# Patient Record
Sex: FEMALE | Race: OTHER | Employment: FULL TIME | URBAN - METROPOLITAN AREA
[De-identification: names, ages, dates, MRNs, and addresses within clinical notes are randomized per-mention and may not be internally consistent; named-entity substitution may affect disease eponyms.]

---

## 2022-02-23 ENCOUNTER — OFFICE VISIT (OUTPATIENT)
Dept: ORTHOPEDIC SURGERY | Age: 62
End: 2022-02-23
Payer: COMMERCIAL

## 2022-02-23 VITALS — BODY MASS INDEX: 29.88 KG/M2 | HEIGHT: 64 IN | WEIGHT: 175 LBS

## 2022-02-23 DIAGNOSIS — M76.62 ACHILLES TENDINITIS OF LEFT LOWER EXTREMITY: ICD-10-CM

## 2022-02-23 DIAGNOSIS — M79.672 PAIN OF LEFT HEEL: ICD-10-CM

## 2022-02-23 DIAGNOSIS — M72.2 PLANTAR FASCIITIS OF LEFT FOOT: Primary | ICD-10-CM

## 2022-02-23 PROCEDURE — 99203 OFFICE O/P NEW LOW 30 MIN: CPT | Performed by: ORTHOPAEDIC SURGERY

## 2022-02-23 PROCEDURE — L4387 NON-PNEUM WALK BOOT PRE OTS: HCPCS | Performed by: ORTHOPAEDIC SURGERY

## 2022-02-23 RX ORDER — METHYLPREDNISOLONE 4 MG/1
TABLET ORAL
Qty: 1 DOSE PACK | Refills: 0 | Status: SHIPPED | OUTPATIENT
Start: 2022-02-23 | End: 2022-03-07

## 2022-02-23 NOTE — Clinical Note
2/23/2022    Patient: Radha Mahmood   YOB: 1960   Date of Visit: 2/23/2022     Dusty Dubon MD  [address] [place] [id number]  Via     Dear Dusty Dubon MD,      Thank you for referring Ms. Radha Mahmood to [facility] for evaluation. My notes for this consultation are attached. If you have questions, please do not hesitate to call me. I look forward to following your patient along with you.       Sincerely,    Latrell Mcneal MD

## 2022-02-23 NOTE — PROGRESS NOTES
Dusty Crowley (: 1960) is a 64 y.o. female, patient,here for evaluation of the following   Chief Complaint   Patient presents with    Foot Injury     she fell on [place] day and when she fell she also damaged ribs and so it was hard to breath and so she was unable to walk or stand and so it was hard to realize that her foot was injured she has pain at the heel up into the foot pad and also at the achilles tendon of the foot         ASSESSMENT/PLAN:  Below is the assessment and plan developed based on review of pertinent history, physical exam, labs, studies, and medications. 1. Plantar fasciitis of left foot  -     REFERRAL TO DME  -     DE NON-PNEUM WALK BOOT PRE OTS  -     methylPREDNISolone (MEDROL DOSEPACK) 4 mg tablet; Take 1 Tablet by mouth Specific Days and Specific Times for 6 days, THEN 1 Tablet Specific Days and Specific Times for 6 days. As instructed on packet, Normal, Disp-1 Dose Pack, R-0  2. Achilles tendinitis of left lower extremity  -     REFERRAL TO DME  -     DE NON-PNEUM WALK BOOT PRE OTS  -     methylPREDNISolone (MEDROL DOSEPACK) 4 mg tablet; Take 1 Tablet by mouth Specific Days and Specific Times for 6 days, THEN 1 Tablet Specific Days and Specific Times for 6 days. As instructed on packet, Normal, Disp-1 Dose Pack, R-0  3. Pain of left heel  -     XR STANDING FOOT LT MIN 3 V; Future  -     REFERRAL TO DME  -     DE NON-PNEUM WALK BOOT PRE OTS  -     methylPREDNISolone (MEDROL DOSEPACK) 4 mg tablet; Take 1 Tablet by mouth Specific Days and Specific Times for 6 days, THEN 1 Tablet Specific Days and Specific Times for 6 days. As instructed on packet, Normal, Disp-1 Dose Pack, R-0      Patient informed of treatment for this condition of plantar fasciitis and Achilles tendinitis, they can be correlated due to the tightness of the lower extremity tendons muscles.   Recommend stretching program as demonstrated today in clinic and provided handout of information about the condition both plantar fasciitis and Achilles tendinitis. Recommended Medrol pack to use and best prescribed sent to pharmacy. Finally provided a tall boot because she is having trouble walking. I recommended obtaining appropriate shoe wear and insoles which she will transition to after she gets out of the boot. Physical therapy will be an option after she gets out of boot. She can return in 6 weeks, at which point we will likely refer to physical therapy if she is feeling much better after the treatment implemented. Approximately 30 minutes was spent with this patient explaining the condition, demonstrated the correct stretching program to do, answering all her questions about treatment plan and options for this chronic problem. There was also concern about a plantar fascial tear, inform if there is a tear it will eventually get better but it can take a long time to get better. Surgery is not indicated. Return in about 6 weeks (around 4/6/2022). Allergies   Allergen Reactions    Nsaids (Non-Steroidal Anti-Inflammatory Drug) Vertigo       Current Outpatient Medications   Medication Sig    methylPREDNISolone (MEDROL DOSEPACK) 4 mg tablet Take 1 Tablet by mouth Specific Days and Specific Times for 6 days, THEN 1 Tablet Specific Days and Specific Times for 6 days. As instructed on packet     No current facility-administered medications for this visit. No past medical history on file. No past surgical history on file. No family history on file.     Social History     Socioeconomic History    Marital status: UNKNOWN     Spouse name: Not on file    Number of children: Not on file    Years of education: Not on file    Highest education level: Not on file   Occupational History    Not on file   Tobacco Use    Smoking status: Never Smoker    Smokeless tobacco: Never Used   Substance and Sexual Activity    Alcohol use: Not Currently    Drug use: Never    Sexual activity: Not on file   Other Topics Concern    Not on file   Social History Narrative    Not on file     Social Determinants of Health     Financial Resource Strain:     Difficulty of Paying Living Expenses: Not on file   Food Insecurity:     Worried About Running Out of Food in the Last Year: Not on file    Nicole of Food in the Last Year: Not on file   Transportation Needs:     Lack of Transportation (Medical): Not on file    Lack of Transportation (Non-Medical): Not on file   Physical Activity:     Days of Exercise per Week: Not on file    Minutes of Exercise per Session: Not on file   Stress:     Feeling of Stress : Not on file   Social Connections:     Frequency of Communication with Friends and Family: Not on file    Frequency of Social Gatherings with Friends and Family: Not on file    Attends Orthodoxy Services: Not on file    Active Member of [address] [place] [address] [employer] or Organizations: Not on file    Attends Club or Organization Meetings: Not on file    Marital Status: Not on file   Intimate Partner Violence:     Fear of Current or Ex-Partner: Not on file    Emotionally Abused: Not on file    Physically Abused: Not on file    Sexually Abused: Not on file   Housing Stability:     Unable to Pay for Housing in the Last Year: Not on file    Number of [place] in the Last Year: Not on file    Unstable Housing in the Last Year: Not on file           Vitals:  Ht 5' 4\" (1.626 m)   Wt 175 lb (79.4 kg)   BMI 30.04 kg/m²    Body mass index is 30.04 kg/m².     ROS     Positive for: Musculoskeletal (left foot )    Negative for: Constitutional, Gastrointestinal, Neurological, Skin, Genitourinary, HENT, Endocrine, Cardiovascular, Eyes, Respiratory, Psychiatric, Allergic/Imm, Heme/Lymph    Last edited by Terrence Fiore on 2022  9:02 AM. (History)              SUBJECTIVE/OBJECTIVE:  Sarthak Mueller (: 1960)   New patient presents today with complaint of left foot pain states that she fell during [place] day and since that time she is having trouble with the left foot, the pain is at the heel extends sometimes into the arch and forefoot. But the pain is worse with the first steps although is feels like it is constantly painful right now. The pain is moderate to severe pain that sharp and throbbing and constant and comes and goes and there is apparent swelling, bruising and weakness when the injury occurred but currently not present. States standing walking make it worse and not getting better. She is not walking or standing much because of this and she has been trying rest, ice and heat. She has been seen at least twice for the same problem by other providers/physicians. She is not diabetic, non-smoker. She has completed about 2 months of physical therapy program.      Physical Exam  Pleasant well-nourished female , alert and oriented to person, time and place, no acute distress. Mild antalgic gait, satisfactory weightbearing stance. Contralateral foot and ankle exam, nontender, no swelling ligaments grossly stable. Normal weightbearing stance. Left Lower Extremity: FROM actively of toes, foot, ankle, knee and hip. No instability of foot or ankle with drawer and stress. 55 strength to TAEHLGSCPeronealsPTib. No skin lesions. TTP at the medial calcaneal insertion of the plantar fascia, tenderness is mild at the arch and tenderness is present at the Achilles tendon near insertion. Silverskoid exam is Positive for gastric tightness. Neurovascular exam intact for light touch sensation, cap refill, dorsalis pedis pulse palpable, flexion/extension strength 5/5. Skin intact without open wounds, lesions or ulcers, no skin discolorations, normal warmth to skin. Imaging:    XR Results (most recent):  Results from Appointment encounter on 02/23/22    XR STANDING FOOT LT MIN 3 V    Narrative  Left foot standing AP, lateral and oblique x-rays show no acute fractures or dislocations, normal bone density.  Plantar heel spur small posterior heel spur moderate size Lolis's. Some degenerative changes at the first and second TMT joint. An electronic signature was used to authenticate this note.   -- Yamilex Martinez MD

## 2022-04-07 ENCOUNTER — OFFICE VISIT (OUTPATIENT)
Dept: ORTHOPEDIC SURGERY | Age: 62
End: 2022-04-07
Payer: COMMERCIAL

## 2022-04-07 VITALS — BODY MASS INDEX: 30.73 KG/M2 | HEIGHT: 64 IN | WEIGHT: 180 LBS

## 2022-04-07 DIAGNOSIS — M76.62 ACHILLES TENDINITIS OF LEFT LOWER EXTREMITY: ICD-10-CM

## 2022-04-07 DIAGNOSIS — M72.2 PLANTAR FASCIITIS OF LEFT FOOT: ICD-10-CM

## 2022-04-07 DIAGNOSIS — M79.672 CHRONIC PAIN OF LEFT HEEL: Primary | ICD-10-CM

## 2022-04-07 DIAGNOSIS — G89.29 CHRONIC PAIN OF LEFT HEEL: Primary | ICD-10-CM

## 2022-04-07 PROCEDURE — 99213 OFFICE O/P EST LOW 20 MIN: CPT | Performed by: ORTHOPAEDIC SURGERY

## 2022-04-07 RX ORDER — PROGESTERONE 100 MG/1
CAPSULE ORAL
COMMUNITY
Start: 2022-02-25

## 2022-04-07 RX ORDER — DEXTROAMPHETAMINE SACCHARATE, AMPHETAMINE ASPARTATE MONOHYDRATE, DEXTROAMPHETAMINE SULFATE AND AMPHETAMINE SULFATE 2.5; 2.5; 2.5; 2.5 MG/1; MG/1; MG/1; MG/1
CAPSULE, EXTENDED RELEASE ORAL
COMMUNITY
Start: 2022-04-04

## 2022-04-07 RX ORDER — CONJUGATED ESTROGENS 0.62 MG/G
0.5 CREAM VAGINAL DAILY
COMMUNITY

## 2022-04-07 RX ORDER — ESTRADIOL 0.05 MG/D
FILM, EXTENDED RELEASE TRANSDERMAL
COMMUNITY
Start: 2022-02-21

## 2022-04-07 NOTE — PROGRESS NOTES
Dhara Hanks (: 1960) is a 64 y.o. female, patient,here for evaluation of the following   Chief Complaint   Patient presents with    Follow-up      Achilles tendinitis of left lower extremity, and the plantar fasciitas         ASSESSMENT/PLAN:  Below is the assessment and plan developed based on review of pertinent history, physical exam, labs, studies, and medications. 1. Chronic pain of left heel  -     XR CALCANEUS LT; Future  -     REFERRAL TO PHYSICAL THERAPY  -     MRI FOOT LT WO CONT; Future  2. Plantar fasciitis of left foot  -     REFERRAL TO PHYSICAL THERAPY  -     MRI FOOT LT WO CONT; Future  3. Achilles tendinitis of left lower extremity  -     REFERRAL TO PHYSICAL THERAPY    Patient has chronic heel pain to the left lower extremity and last seen it was diagnosis plantar fasciitis but today she also has tendinitis of the Achilles tendon. We again discussed the treatment to continue with the exercises provided, physical therapy referral for exercise with therapy. We did get a calcaneus x-rays to make sure she does not have a fracture which was not seen. We provided a parking placard to use for her job as she works at [employer] and has to walk several blocks to get to work. We will see again in approximate 6 to 8 weeks especially if there is persistent pain. If the pain does not improve after all the treatments previously recommended and recommended again today, an MRI may be warranted. Return in about 6 weeks (around 2022), or if symptoms worsen or fail to improve.       Allergies   Allergen Reactions    Nsaids (Non-Steroidal Anti-Inflammatory Drug) Vertigo       Current Outpatient Medications   Medication Sig    amphetamine-dextroamphetamine XR (ADDERALL XR) 10 mg XR capsule     progesterone (PROMETRIUM) 100 mg capsule take 1 capsule by mouth at bedtime    estradioL (Paola) 0.05 mg/24 hr apply 1 patch affected area EVERY MONDAY AND     conjugated estrogens (Premarin) 0.625 mg/gram vaginal cream Insert 0.5 g into vagina daily. No current facility-administered medications for this visit. History reviewed. No pertinent past medical history. History reviewed. No pertinent surgical history. History reviewed. No pertinent family history. Social History     Socioeconomic History    Marital status: SINGLE     Spouse name: Not on file    Number of children: Not on file    Years of education: Not on file    Highest education level: Not on file   Occupational History    Not on file   Tobacco Use    Smoking status: Never Smoker    Smokeless tobacco: Never Used   Substance and Sexual Activity    Alcohol use: Not Currently    Drug use: Never    Sexual activity: Not on file   Other Topics Concern    Not on file   Social History Narrative    Not on file     Social Determinants of Health     Financial Resource Strain:     Difficulty of Paying Living Expenses: Not on file   Food Insecurity:     Worried About Running Out of Food in the Last Year: Not on file    Nicole of Food in the Last Year: Not on file   Transportation Needs:     Lack of Transportation (Medical): Not on file    Lack of Transportation (Non-Medical):  Not on file   Physical Activity:     Days of Exercise per Week: Not on file    Minutes of Exercise per Session: Not on file   Stress:     Feeling of Stress : Not on file   Social Connections:     Frequency of Communication with Friends and Family: Not on file    Frequency of Social Gatherings with Friends and Family: Not on file    Attends Protestant Services: Not on file    Active Member of Clubs or Organizations: Not on file    Attends Club or Organization Meetings: Not on file    Marital Status: Not on file   Intimate Partner Violence:     Fear of Current or Ex-Partner: Not on file    Emotionally Abused: Not on file    Physically Abused: Not on file    Sexually Abused: Not on file   Housing Stability:     Unable to Pay for Housing in the Last Year: Not on file    Number of Places Lived in the Last Year: Not on file    Unstable Housing in the Last Year: Not on file           Vitals:  Ht 5' 4\" (1.626 m)   Wt 180 lb (81.6 kg)   BMI 30.90 kg/m²    Body mass index is 30.9 kg/m². SUBJECTIVE/OBJECTIVE:  Richard Dockery (: 1960)   Patient returns today for reevaluation of the left foot plantar fasciitis and also experiencing some tendon problems. She is doing the exercises but continues to have some problems. Physical Exam  Pleasant well-nourished female , alert and oriented to person, time and place, no acute distress. Mild antalgic gait, satisfactory weightbearing stance. Left lower extremity/ankle: There is a tight Achilles tendon that has tenderness at the posterior heel and at insertional area, the rest of ankle exam is nontender. There is a positive Silfverskiold test.    Left foot: Tenderness at the plantar heel, no swelling, ligaments grossly stable. Rest of foot nontender. Contralateral foot and ankle exam, nontender, no swelling ligaments grossly stable. Normal weightbearing stance. Neurovascular exam intact for light touch sensation, cap refill, dorsalis pedis pulse palpable, flexion/extension strength 5/5. Skin intact without open wounds, lesions or ulcers, no skin discolorations, normal warmth to skin. Imaging:    XR Results (most recent):  Results from Appointment encounter on 22    XR CALCANEUS LT    Narrative  Left calcaneus lateral and axial view show no obvious fractures or dislocation, there is a large plantar heel spur and small posterior heel spur. No acute abnormalities. Satisfactory bone density. An electronic signature was used to authenticate this note.   -- Marzena Landry MD

## 2022-04-07 NOTE — Clinical Note
4/13/2022    Patient: Lona Mcneal   YOB: 1960   Date of Visit: 4/7/2022     Vel Cannon MD  [address] [place] [address]  Teresa Sara [id number]  Via     Dear Vel Cannon MD,      Thank you for referring Ms. Lona Mcneal to [facility] for evaluation. My notes for this consultation are attached. If you have questions, please do not hesitate to call me. I look forward to following your patient along with you.       Sincerely,    Dakota Rodríguez MD

## 2022-04-21 ENCOUNTER — OFFICE VISIT (OUTPATIENT)
Dept: ORTHOPEDIC SURGERY | Age: 62
End: 2022-04-21
Payer: COMMERCIAL

## 2022-04-21 VITALS — HEIGHT: 64 IN | BODY MASS INDEX: 30.73 KG/M2 | WEIGHT: 180 LBS

## 2022-04-21 DIAGNOSIS — M79.672 LEFT FOOT PAIN: ICD-10-CM

## 2022-04-21 DIAGNOSIS — M62.9 NONTRAUMATIC TEAR OF PLANTAR FASCIA: Primary | ICD-10-CM

## 2022-04-21 DIAGNOSIS — M76.62 ACHILLES TENDINITIS OF LEFT LOWER EXTREMITY: ICD-10-CM

## 2022-04-21 PROCEDURE — 99213 OFFICE O/P EST LOW 20 MIN: CPT | Performed by: ORTHOPAEDIC SURGERY

## 2022-04-21 RX ORDER — METHYLPREDNISOLONE 4 MG/1
TABLET ORAL
Qty: 1 DOSE PACK | Refills: 0 | Status: SHIPPED | OUTPATIENT
Start: 2022-04-21 | End: 2022-05-03

## 2022-04-21 NOTE — PROGRESS NOTES
Bowen Hitchcock (: 1960) is a 64 y.o. female, patient,here for evaluation of the following   Chief Complaint   Patient presents with    Follow-up     Left foot MRI review         ASSESSMENT/PLAN:  Below is the assessment and plan developed based on review of pertinent history, physical exam, labs, studies, and medications. 1. Nontraumatic tear of plantar fascia  -     REFERRAL TO PHYSICAL THERAPY  -     methylPREDNISolone (MEDROL DOSEPACK) 4 mg tablet; Take 1 Tablet by mouth Specific Days and Specific Times for 6 days, THEN 1 Tablet Specific Days and Specific Times for 6 days. As instructed on packet, Normal, Disp-1 Dose Pack, R-0  2. Achilles tendinitis of left lower extremity  -     REFERRAL TO PHYSICAL THERAPY  -     methylPREDNISolone (MEDROL DOSEPACK) 4 mg tablet; Take 1 Tablet by mouth Specific Days and Specific Times for 6 days, THEN 1 Tablet Specific Days and Specific Times for 6 days. As instructed on packet, Normal, Disp-1 Dose Pack, R-0  3. Left foot pain  -     REFERRAL TO PHYSICAL THERAPY  -     methylPREDNISolone (MEDROL DOSEPACK) 4 mg tablet; Take 1 Tablet by mouth Specific Days and Specific Times for 6 days, THEN 1 Tablet Specific Days and Specific Times for 6 days. As instructed on packet, Normal, Disp-1 Dose Pack, R-0    Patient has a nontraumatic tear of the plantar fascia which is likely reason why she had persistent pain there and that will eventually improve. I recommend continued stretching program as demonstrated today in clinic. In terms of the Achilles tendinitis, she wants to try iontophoresis treatment and I agree that could be helpful so I prescribed the treatment with a therapist.  A new therapy referral is made today. I also prescribe Medrol pack to use over the next few days to allow further recovery of the symptoms of pain and swelling.   Recommend maximizing conservative treatment, if in the future the Achilles tendon pain becomes more severe, an MRI may be indicated at the left Achilles tendon and the for now going to MRI of Achilles tendon because of pain, generally I am considering surgery. Further discussion about surgical treatment will be had if the Achilles tendon continues to be a problem. Return if symptoms worsen or fail to improve. Allergies   Allergen Reactions    Nsaids (Non-Steroidal Anti-Inflammatory Drug) Vertigo       Current Outpatient Medications   Medication Sig    methylPREDNISolone (MEDROL DOSEPACK) 4 mg tablet Take 1 Tablet by mouth Specific Days and Specific Times for 6 days, THEN 1 Tablet Specific Days and Specific Times for 6 days. As instructed on packet    amphetamine-dextroamphetamine XR (ADDERALL XR) 10 mg XR capsule     progesterone (PROMETRIUM) 100 mg capsule take 1 capsule by mouth at bedtime    estradioL (Paola) 0.05 mg/24 hr apply 1 patch affected area EVERY MONDAY AND THURDAY    conjugated estrogens (Premarin) 0.625 mg/gram vaginal cream Insert 0.5 g into vagina daily. No current facility-administered medications for this visit. History reviewed. No pertinent past medical history. History reviewed. No pertinent surgical history. History reviewed. No pertinent family history.     Social History     Socioeconomic History    Marital status: SINGLE     Spouse name: Not on file    Number of children: Not on file    Years of education: Not on file    Highest education level: Not on file   Occupational History    Not on file   Tobacco Use    Smoking status: Never Smoker    Smokeless tobacco: Never Used   Substance and Sexual Activity    Alcohol use: Not Currently    Drug use: Never    Sexual activity: Not on file   Other Topics Concern    Not on file   Social History Narrative    Not on file     Social Determinants of Health     Financial Resource Strain:     Difficulty of Paying Living Expenses: Not on file   Food Insecurity:     Worried About Running Out of Food in the Last Year: Not on file    Nicole diane Food in the Last Year: Not on file   Transportation Needs:     Lack of Transportation (Medical): Not on file    Lack of Transportation (Non-Medical): Not on file   Physical Activity:     Days of Exercise per Week: Not on file    Minutes of Exercise per Session: Not on file   Stress:     Feeling of Stress : Not on file   Social Connections:     Frequency of Communication with Friends and Family: Not on file    Frequency of Social Gatherings with Friends and Family: Not on file    Attends Buddhist Services: Not on file    Active Member of [address] [place] [address] [employer] or Organizations: Not on file    Attends Club or Organization Meetings: Not on file    Marital Status: Not on file   Intimate Partner Violence:     Fear of Current or Ex-Partner: Not on file    Emotionally Abused: Not on file    Physically Abused: Not on file    Sexually Abused: Not on file   Housing Stability:     Unable to Pay for Housing in the Last Year: Not on file    Number of [place] in the Last Year: Not on file    Unstable Housing in the Last Year: Not on file           Vitals:  Ht 5' 4\" (1.626 m)   Wt 180 lb (81.6 kg)   BMI 30.90 kg/m²    Body mass index is 30.9 kg/m². SUBJECTIVE/OBJECTIVE:  Brooke Kessler (: 1960)   Patient is back today for reevaluation of Planter fasciitis, we got an MRI because of persistent pain to the heel and we wanted to make sure it was Planter fasciitis. She has a new problem where she has pain up the backside of her Achilles tendon as she had also been trying to go through physical therapy. She had been seen by a therapist who suggested iontophoresis. She wants to know if that could be done. In terms of her heel pain, there has been mild improvements. And the MRI will be reviewed with her today. Physical Exam  Pleasant well-nourished female , alert and oriented to person, time and place, no acute distress. Mild antalgic gait, satisfactory weightbearing stance.     Left lower extremity/ankle: There is mild tenderness at the Achilles tendon, tendons intact with a negative Portillo test, negative ankle squeeze test.    Left foot: There is minimal tenderness to palpation at plantar fascia, negative calcaneus squeeze test, no swelling, no ecchymosis, ligaments grossly stable. Positive Silfverskiold test.    Contralateral foot and ankle exam, nontender, no swelling ligaments grossly stable. Normal weightbearing stance. Neurovascular exam intact for light touch sensation, cap refill, dorsalis pedis pulse palpable, flexion/extension strength 5/5. Skin intact without open wounds, lesions or ulcers, no skin discolorations, normal warmth to skin. Imaging:    No x-rays were obtained today. Previous x-rays have been negative for fractures or dislocation    Reviewed the MRI without contrast dated April 14, 2022. It shows an MRI of the left foot and there is edema at the partial tear Planter fasciitis. There is an atrophy near the Baxters nerve at abductor digiti minimi. This does not appear to be acute. This study was reviewed on PACS, confirmed with the radiology report, detailed information in chart. Summary is below. IMPRESSION     1. Plantar fasciitis with associated partial tearing and edema in the adjacent  muscles  2. Atrophy of the abductor digit minimi which can be seen with chronic  neuropathy        An electronic signature was used to authenticate this note.   -- Norma Dawson MD

## 2022-04-21 NOTE — Clinical Note
4/28/2022    Patient: Richard Dockery   YOB: 1960   Date of Visit: 4/21/2022     Chin Keith MD  [address] [place] [id number]  Via     Dear Chin Keith MD,      Thank you for referring Ms. Richard Dockery to [facility] for evaluation. My notes for this consultation are attached. If you have questions, please do not hesitate to call me. I look forward to following your patient along with you.       Sincerely,    Marzena Landry MD

## 2022-06-02 ENCOUNTER — OFFICE VISIT (OUTPATIENT)
Dept: ORTHOPEDIC SURGERY | Age: 62
End: 2022-06-02
Payer: COMMERCIAL

## 2022-06-02 VITALS — WEIGHT: 180 LBS | BODY MASS INDEX: 30.73 KG/M2 | HEIGHT: 64 IN

## 2022-06-02 DIAGNOSIS — M72.2 PLANTAR FASCIITIS, LEFT: Primary | ICD-10-CM

## 2022-06-02 DIAGNOSIS — G89.29 CHRONIC HEEL PAIN, LEFT: ICD-10-CM

## 2022-06-02 DIAGNOSIS — M79.672 CHRONIC HEEL PAIN, LEFT: ICD-10-CM

## 2022-06-02 PROCEDURE — 99212 OFFICE O/P EST SF 10 MIN: CPT | Performed by: ORTHOPAEDIC SURGERY

## 2022-06-02 NOTE — PROGRESS NOTES
Fina Jefferson (: 1960) is a 64 y.o. female, patient,here for evaluation of the following   Chief Complaint   Patient presents with    Ankle Pain     left ankle        ASSESSMENT/PLAN:  Below is the assessment and plan developed based on review of pertinent history, physical exam, labs, studies, and medications. 1. Plantar fasciitis, left  -     REFERRAL TO PHYSICAL THERAPY  2. Chronic heel pain, left    Patient is doing okay but still having some pain at the left plantar fascia related to Planter fasciitis, she wants to consider the physical therapy treatment with iontophoresis and were finally able to prescribe the DSMO, paper prescription for written for pharmacist.  Hopefully she will get this treatment completed and pending on how she does, she will return for alternative treatments if the pain is not improved with the iontophoresis treatment by physical therapist.  We have recommended following up in approxi-6 weeks to see how she is doing. Patient agrees with plan. Return in about 6 weeks (around 2022). Allergies   Allergen Reactions    Nsaids (Non-Steroidal Anti-Inflammatory Drug) Vertigo       Current Outpatient Medications   Medication Sig    amphetamine-dextroamphetamine XR (ADDERALL XR) 10 mg XR capsule     progesterone (PROMETRIUM) 100 mg capsule take 1 capsule by mouth at bedtime    estradioL (Paola) 0.05 mg/24 hr apply 1 patch affected area EVERY MONDAY AND     conjugated estrogens (Premarin) 0.625 mg/gram vaginal cream Insert 0.5 g into vagina daily. No current facility-administered medications for this visit. No past medical history on file. No past surgical history on file. No family history on file.     Social History     Socioeconomic History    Marital status: SINGLE     Spouse name: Not on file    Number of children: Not on file    Years of education: Not on file    Highest education level: Not on file   Occupational History    Not on file   Tobacco Use    Smoking status: Never Smoker    Smokeless tobacco: Never Used   Substance and Sexual Activity    Alcohol use: Not Currently    Drug use: Never    Sexual activity: Not on file   Other Topics Concern    Not on file   Social History Narrative    Not on file     Social Determinants of Health     Financial Resource Strain:     Difficulty of Paying Living Expenses: Not on file   Food Insecurity:     Worried About Running Out of Food in the Last Year: Not on file    Nicole of Food in the Last Year: Not on file   Transportation Needs:     Lack of Transportation (Medical): Not on file    Lack of Transportation (Non-Medical): Not on file   Physical Activity:     Days of Exercise per Week: Not on file    Minutes of Exercise per Session: Not on file   Stress:     Feeling of Stress : Not on file   Social Connections:     Frequency of Communication with Friends and Family: Not on file    Frequency of Social Gatherings with Friends and Family: Not on file    Attends Restorationist Services: Not on file    Active Member of [address] [place] [address] or Organizations: Not on file    Attends Club or Organization Meetings: Not on file    Marital Status: Not on file   Intimate Partner Violence:     Fear of Current or Ex-Partner: Not on file    Emotionally Abused: Not on file    Physically Abused: Not on file    Sexually Abused: Not on file   Housing Stability:     Unable to Pay for Housing in the Last Year: Not on file    Number of [place] in the Last Year: Not on file    Unstable Housing in the Last Year: Not on file           Vitals:  Ht 5' 4\" (1.626 m)   Wt 180 lb (81.6 kg)   BMI 30.90 kg/m²    Body mass index is 30.9 kg/m². SUBJECTIVE/OBJECTIVE:  Richard Dockery (: 1960)   Patient is back for reevaluation of the left foot where she has had persistent chronic Planter fasciitis.   She had been seeing a physical therapist who wanted to try iontophoresis and we had not been able to get the medication prescribed for her. She is back today, she has no other complaints except the same, she does not wish to proceed with an injection but wants to do the iontophoresis prior to considering other invasive procedures. Physical Exam  Pleasant well-nourished female , alert and oriented to person, time and place, no acute distress. Mild antalgic gait, satisfactory weightbearing stance. Left Lower Extremity: FROM actively of toes, foot, ankle, knee and hip. No instability of foot or ankle with drawer and stress. 55 strength to TAEHLGSCPeronealsPTib. No skin lesions. TTP at the medial calcaneal insertion of the plantar fascia. This spot is very painful. Silverskoid exam is Positive for gastric tightness. Neurovascular exam intact for light touch sensation, cap refill, dorsalis pedis pulse palpable, flexion/extension strength 5/5. Skin intact without open wounds, lesions or ulcers, no skin discolorations, normal warmth to skin. Imaging:    No x-rays were obtained. An electronic signature was used to authenticate this note.   -- Merari Monge MD

## 2022-06-02 NOTE — Clinical Note
2022    Patient: Dhara Hanks   YOB: 1960   Date of Visit: 2022     Lakesah Holley MD  [address] [place] [address] [address] [id number]  [address]     Dear Lakesha Holley MD,      Thank you for referring Ms. Dhara Hanks to [place] for evaluation. My notes for this consultation are attached. If you have questions, please do not hesitate to call me. I look forward to following your patient along with you.       Sincerely,    Aleene Apgar, MD

## 2022-06-02 NOTE — LETTER
6/6/2022 10:53 AM    Ms. Bill Cameron  [address] [phone]              Sincerely,      Marilyn Wilson MD

## 2022-07-15 ENCOUNTER — OFFICE VISIT (OUTPATIENT)
Dept: ORTHOPEDIC SURGERY | Age: 62
End: 2022-07-15
Payer: COMMERCIAL

## 2022-07-15 VITALS — HEIGHT: 64 IN | WEIGHT: 180 LBS | BODY MASS INDEX: 30.73 KG/M2

## 2022-07-15 DIAGNOSIS — G89.29 CHRONIC HEEL PAIN, LEFT: ICD-10-CM

## 2022-07-15 DIAGNOSIS — M79.672 CHRONIC HEEL PAIN, LEFT: ICD-10-CM

## 2022-07-15 DIAGNOSIS — M72.2 PLANTAR FASCIITIS OF LEFT FOOT: Primary | ICD-10-CM

## 2022-07-15 PROCEDURE — 99212 OFFICE O/P EST SF 10 MIN: CPT | Performed by: ORTHOPAEDIC SURGERY

## 2022-07-15 NOTE — PROGRESS NOTES
Elke Rivas (: 1960) is a 64 y.o. female, patient,here for evaluation of the following   Chief Complaint   Patient presents with    Foot Pain     left        ASSESSMENT/PLAN:  Below is the assessment and plan developed based on review of pertinent history, physical exam, labs, studies, and medications. 1. Plantar fasciitis of left foot  2. Chronic heel pain, left      Patient is seen back today, she continues to have chronic left heel pain although examination shows better exam meaning that she is less tender. She is provided a prescription for Iontopatch extra strength. She will continue with physical therapy program.  Informed briefly of other treatments if still having symptoms despite maximizing treatment with the therapy program and Tylenol for resistant cortisone injection may be helpful. She will return if she elects to proceed with 1 cortisone injection if symptoms persist.  She has had other studies in the past confirming that this is indeed Planter fasciitis. No x-rays are needed at next follow-up. Return if symptoms worsen or fail to improve, for cortizone injection. Allergies   Allergen Reactions    Nsaids (Non-Steroidal Anti-Inflammatory Drug) Vertigo       Current Outpatient Medications   Medication Sig    amphetamine-dextroamphetamine XR (ADDERALL XR) 10 mg XR capsule     progesterone (PROMETRIUM) 100 mg capsule take 1 capsule by mouth at bedtime    estradioL (Paola) 0.05 mg/24 hr apply 1 patch affected area EVERY MONDAY AND     conjugated estrogens (Premarin) 0.625 mg/gram vaginal cream Insert 0.5 g into vagina daily. No current facility-administered medications for this visit. No past medical history on file. No past surgical history on file. No family history on file.     Social History     Socioeconomic History    Marital status: SINGLE     Spouse name: Not on file    Number of children: Not on file    Years of education: Not on file    Highest education level: Not on file   Occupational History    Not on file   Tobacco Use    Smoking status: Never Smoker    Smokeless tobacco: Never Used   Substance and Sexual Activity    Alcohol use: Not Currently    Drug use: Never    Sexual activity: Not on file   Other Topics Concern    Not on file   Social History Narrative    Not on file     Social Determinants of Health     Financial Resource Strain:     Difficulty of Paying Living Expenses: Not on file   Food Insecurity:     Worried About Running Out of Food in the Last Year: Not on file    Nicole of Food in the Last Year: Not on file   Transportation Needs:     Lack of Transportation (Medical): Not on file    Lack of Transportation (Non-Medical): Not on file   Physical Activity:     Days of Exercise per Week: Not on file    Minutes of Exercise per Session: Not on file   Stress:     Feeling of Stress : Not on file   Social Connections:     Frequency of Communication with Friends and Family: Not on file    Frequency of Social Gatherings with Friends and Family: Not on file    Attends Oriental orthodox Services: Not on file    Active Member of [address] [place] [address] or Organizations: Not on file    Attends Club or Organization Meetings: Not on file    Marital Status: Not on file   Intimate Partner Violence:     Fear of Current or Ex-Partner: Not on file    Emotionally Abused: Not on file    Physically Abused: Not on file    Sexually Abused: Not on file   Housing Stability:     Unable to Pay for Housing in the Last Year: Not on file    Number of [place] in the Last Year: Not on file    Unstable Housing in the Last Year: Not on file           Vitals:  Ht 5' 4\" (1.626 m)   Wt 180 lb (81.6 kg)   BMI 30.90 kg/m²    Body mass index is 30.9 kg/m². SUBJECTIVE/OBJECTIVE:  Janelle Davila (: 1960)   Patient continues to deal with the chronic Planter fasciitis of the left foot. She has been undergoing iontophoresis treatment.   She feels there has been some improvement since the initial problem started December 25, 2021. She is also doing home exercises. Physical Exam  Pleasant well-nourished female , alert and oriented to person, time and place, no acute distress. Mild antalgic gait, satisfactory weightbearing stance. Left Lower Extremity: FROM actively of toes, foot, ankle, knee and hip. No instability of foot or ankle with drawer and stress. 55 strength to TAEHLGSCPeronealsPTib. No skin lesions. TTP at the medial calcaneal insertion of the plantar fascia. This spot is very painful. Silverskoid exam is Negative for gastric tightness. Contralateral foot and ankle exam, nontender, no swelling ligaments grossly stable. Normal weightbearing stance. Neurovascular exam intact for light touch sensation, cap refill, dorsalis pedis pulse palpable, flexion/extension strength 5/5. Skin intact without open wounds, lesions or ulcers, no skin discolorations, normal warmth to skin. Imaging:    No x-rays obtained. He is x-ray is normal.        An electronic signature was used to authenticate this note.   -- Azeem Lala MD

## 2022-07-15 NOTE — LETTER
7/18/2022    Patient: rBaulio Dominique   YOB: 1960   Date of Visit: 7/15/2022     David Paige MD  [address] [place] [address]  Via Fax: [phone]    Dear David Paige MD,      Thank you for referring Ms. Braulio Dominique to [facility] for evaluation. My notes for this consultation are attached. If you have questions, please do not hesitate to call me. I look forward to following your patient along with you.       Sincerely,    Ramon Cabrera MD

## 2025-08-04 ENCOUNTER — OFFICE VISIT (OUTPATIENT)
Age: 65
End: 2025-08-04
Payer: COMMERCIAL

## 2025-08-04 VITALS
HEIGHT: 64 IN | DIASTOLIC BLOOD PRESSURE: 70 MMHG | BODY MASS INDEX: 24.24 KG/M2 | WEIGHT: 142 LBS | SYSTOLIC BLOOD PRESSURE: 112 MMHG | HEART RATE: 76 BPM

## 2025-08-04 DIAGNOSIS — E04.2 MULTIPLE THYROID NODULES: ICD-10-CM

## 2025-08-04 DIAGNOSIS — E05.90 SUBCLINICAL HYPERTHYROIDISM: Primary | ICD-10-CM

## 2025-08-04 PROCEDURE — 99204 OFFICE O/P NEW MOD 45 MIN: CPT | Performed by: INTERNAL MEDICINE

## 2025-08-04 PROCEDURE — G2211 COMPLEX E/M VISIT ADD ON: HCPCS | Performed by: INTERNAL MEDICINE

## 2025-08-04 RX ORDER — SEMAGLUTIDE 2.4 MG/.75ML
INJECTION, SOLUTION SUBCUTANEOUS
COMMUNITY

## 2025-08-05 LAB
FT4I SERPL CALC-MCNC: 2.1 (ref 1.2–4.9)
T3 SERPL-MCNC: 125 NG/DL (ref 71–180)
T3RU NFR SERPL: 26 % (ref 24–39)
T4 SERPL-MCNC: 8.1 UG/DL (ref 4.5–12)
THYROGLOB AB SERPL-ACNC: <1 IU/ML (ref 0–0.9)
TSH RECEP AB SER-ACNC: <1.1 IU/L (ref 0–1.75)
TSH SERPL DL<=0.005 MIU/L-ACNC: 0.15 UIU/ML (ref 0.45–4.5)
TSI SER-ACNC: <0.1 IU/L (ref 0–0.55)